# Patient Record
Sex: MALE | Race: WHITE | NOT HISPANIC OR LATINO | Employment: FULL TIME | URBAN - METROPOLITAN AREA
[De-identification: names, ages, dates, MRNs, and addresses within clinical notes are randomized per-mention and may not be internally consistent; named-entity substitution may affect disease eponyms.]

---

## 2019-03-05 ENCOUNTER — TRANSCRIBE ORDERS (OUTPATIENT)
Dept: ADMINISTRATIVE | Facility: HOSPITAL | Age: 59
End: 2019-03-05

## 2019-03-05 ENCOUNTER — HOSPITAL ENCOUNTER (OUTPATIENT)
Dept: CT IMAGING | Facility: HOSPITAL | Age: 59
Discharge: HOME | End: 2019-03-05
Admitting: FAMILY MEDICINE

## 2019-03-05 DIAGNOSIS — K57.92 DIVERTICULITIS: Primary | ICD-10-CM

## 2019-03-05 DIAGNOSIS — K57.92 DIVERTICULITIS: ICD-10-CM

## 2019-03-05 PROCEDURE — 0 IOPAMIDOL PER 1 ML: Performed by: FAMILY MEDICINE

## 2019-03-05 PROCEDURE — 74177 CT ABD & PELVIS W/CONTRAST: CPT

## 2019-03-05 RX ORDER — SODIUM CHLORIDE 9 MG/ML
INJECTION, SOLUTION INTRAVENOUS
Status: DISPENSED
Start: 2019-03-05 | End: 2019-03-06

## 2019-03-05 RX ADMIN — IOPAMIDOL 100 ML: 755 INJECTION, SOLUTION INTRAVENOUS at 16:13

## 2019-04-10 ENCOUNTER — PREP FOR SURGERY (OUTPATIENT)
Dept: OTHER | Facility: HOSPITAL | Age: 59
End: 2019-04-10

## 2019-04-10 ENCOUNTER — TELEPHONE (OUTPATIENT)
Dept: GASTROENTEROLOGY | Facility: CLINIC | Age: 59
End: 2019-04-10

## 2019-04-10 DIAGNOSIS — Z12.11 SCREENING FOR COLON CANCER: Primary | ICD-10-CM

## 2019-04-10 NOTE — TELEPHONE ENCOUNTER
NEED EXTERNAL ORDER - COLONOSCOPY -  ABNORMAL CT.  PER WIFE NEVER HAD COLONOSCOPY BEFORE, THIS WILL HIS FIRST ONE.

## 2019-04-10 NOTE — TELEPHONE ENCOUNTER
CALLED AND SPOKE WITH WIFE.  NEEDS Monday, DOES NOT WANT TO MISS TWO DAYS OF WORK.  SCHEDULED AT [facility] 05/20/2019 AT 11:30AM - ARRIVE 10:30AM.  WILL MAIL INSTRUCTIONS.    PER WIFE, NEVER HAD COLONOSCOPY.  THIS WILL BE FIRST ONE.

## 2019-05-20 ENCOUNTER — OUTSIDE FACILITY SERVICE (OUTPATIENT)
Dept: GASTROENTEROLOGY | Facility: CLINIC | Age: 59
End: 2019-05-20

## 2019-05-20 PROCEDURE — 45378 DIAGNOSTIC COLONOSCOPY: CPT | Performed by: INTERNAL MEDICINE

## 2024-06-27 ENCOUNTER — TRANSCRIBE ORDERS (OUTPATIENT)
Dept: CARDIOLOGY | Facility: HOSPITAL | Age: 64
End: 2024-06-27
Payer: COMMERCIAL

## 2024-06-27 DIAGNOSIS — R01.1 SYSTOLIC MURMUR: Primary | ICD-10-CM
